# Patient Record
Sex: MALE | Race: WHITE
[De-identification: names, ages, dates, MRNs, and addresses within clinical notes are randomized per-mention and may not be internally consistent; named-entity substitution may affect disease eponyms.]

---

## 2020-08-01 ENCOUNTER — HOSPITAL ENCOUNTER (EMERGENCY)
Dept: HOSPITAL 92 - ERS | Age: 60
Discharge: HOME | End: 2020-08-01
Payer: SELF-PAY

## 2020-08-01 DIAGNOSIS — N39.0: ICD-10-CM

## 2020-08-01 DIAGNOSIS — D63.1: ICD-10-CM

## 2020-08-01 DIAGNOSIS — F17.210: ICD-10-CM

## 2020-08-01 DIAGNOSIS — N18.6: ICD-10-CM

## 2020-08-01 DIAGNOSIS — T83.511A: Primary | ICD-10-CM

## 2020-08-01 LAB
ALBUMIN SERPL BCG-MCNC: 3.6 G/DL (ref 3.5–5)
ALP SERPL-CCNC: 119 U/L (ref 40–110)
ALT SERPL W P-5'-P-CCNC: 14 U/L (ref 8–55)
ANION GAP SERPL CALC-SCNC: 12 MMOL/L (ref 10–20)
AST SERPL-CCNC: 20 U/L (ref 5–34)
BACTERIA UR QL AUTO: (no result) HPF
BASOPHILS # BLD AUTO: 0.1 THOU/UL (ref 0–0.2)
BASOPHILS NFR BLD AUTO: 0.9 % (ref 0–1)
BILIRUB SERPL-MCNC: 0.4 MG/DL (ref 0.2–1.2)
BUN SERPL-MCNC: 36 MG/DL (ref 8.4–25.7)
CALCIUM SERPL-MCNC: 8.6 MG/DL (ref 7.8–10.44)
CHLORIDE SERPL-SCNC: 98 MMOL/L (ref 98–107)
CO2 SERPL-SCNC: 26 MMOL/L (ref 22–29)
CREAT CL PREDICTED SERPL C-G-VRATE: 0 ML/MIN (ref 70–130)
EOSINOPHIL # BLD AUTO: 0.4 THOU/UL (ref 0–0.7)
EOSINOPHIL NFR BLD AUTO: 5.9 % (ref 0–10)
GLOBULIN SER CALC-MCNC: 3.7 G/DL (ref 2.4–3.5)
GLUCOSE SERPL-MCNC: 80 MG/DL (ref 70–105)
HGB BLD-MCNC: 11.7 G/DL (ref 14–18)
LYMPHOCYTES # BLD: 1.1 THOU/UL (ref 1.2–3.4)
LYMPHOCYTES NFR BLD AUTO: 17.5 % (ref 21–51)
MCH RBC QN AUTO: 33.2 PG (ref 27–31)
MCV RBC AUTO: 97.9 FL (ref 78–98)
MONOCYTES # BLD AUTO: 0.6 THOU/UL (ref 0.11–0.59)
MONOCYTES NFR BLD AUTO: 9.3 % (ref 0–10)
NEUTROPHILS # BLD AUTO: 4.2 THOU/UL (ref 1.4–6.5)
NEUTROPHILS NFR BLD AUTO: 66.5 % (ref 42–75)
PLATELET # BLD AUTO: 139 THOU/UL (ref 130–400)
POTASSIUM SERPL-SCNC: 4.3 MMOL/L (ref 3.5–5.1)
PROT UR STRIP.AUTO-MCNC: 100 MG/DL
RBC # BLD AUTO: 3.53 MILL/UL (ref 4.7–6.1)
RBC UR QL AUTO: (no result) HPF (ref 0–3)
SODIUM SERPL-SCNC: 132 MMOL/L (ref 136–145)
SP GR UR STRIP: 1.01 (ref 1–1.03)
WBC # BLD AUTO: 6.4 THOU/UL (ref 4.8–10.8)
WBC UR QL AUTO: (no result) HPF (ref 0–3)

## 2020-08-01 PROCEDURE — 81003 URINALYSIS AUTO W/O SCOPE: CPT

## 2020-08-01 PROCEDURE — 87086 URINE CULTURE/COLONY COUNT: CPT

## 2020-08-01 PROCEDURE — 81015 MICROSCOPIC EXAM OF URINE: CPT

## 2020-08-01 PROCEDURE — 80053 COMPREHEN METABOLIC PANEL: CPT

## 2020-08-01 PROCEDURE — 36415 COLL VENOUS BLD VENIPUNCTURE: CPT

## 2020-08-01 PROCEDURE — 87186 SC STD MICRODIL/AGAR DIL: CPT

## 2020-08-01 PROCEDURE — 85025 COMPLETE CBC W/AUTO DIFF WBC: CPT

## 2020-08-01 PROCEDURE — 87077 CULTURE AEROBIC IDENTIFY: CPT

## 2020-08-01 PROCEDURE — 51702 INSERT TEMP BLADDER CATH: CPT

## 2020-09-08 ENCOUNTER — HOSPITAL ENCOUNTER (EMERGENCY)
Dept: HOSPITAL 92 - ERS | Age: 60
Discharge: HOME | End: 2020-09-08
Payer: MEDICAID

## 2020-09-08 DIAGNOSIS — N18.6: ICD-10-CM

## 2020-09-08 DIAGNOSIS — F17.210: ICD-10-CM

## 2020-09-08 DIAGNOSIS — T83.511A: Primary | ICD-10-CM

## 2020-09-08 LAB
BACTERIA UR QL AUTO: (no result) HPF
LEUKOCYTE ESTERASE UR QL STRIP.AUTO: 500 LEU/UL
PROT UR STRIP.AUTO-MCNC: 50 MG/DL
SP GR UR STRIP: 1.01 (ref 1–1.04)

## 2020-09-08 PROCEDURE — 99283 EMERGENCY DEPT VISIT LOW MDM: CPT

## 2020-09-08 PROCEDURE — 81015 MICROSCOPIC EXAM OF URINE: CPT

## 2020-09-08 PROCEDURE — 81003 URINALYSIS AUTO W/O SCOPE: CPT

## 2020-09-23 ENCOUNTER — HOSPITAL ENCOUNTER (EMERGENCY)
Dept: HOSPITAL 92 - ERS | Age: 60
Discharge: HOME | End: 2020-09-23
Payer: MEDICAID

## 2020-09-23 DIAGNOSIS — I12.0: ICD-10-CM

## 2020-09-23 DIAGNOSIS — R07.81: Primary | ICD-10-CM
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
## 2020-09-23 DIAGNOSIS — N18.6: ICD-10-CM

## 2020-09-23 DIAGNOSIS — N39.0: ICD-10-CM

## 2020-09-23 LAB
ALBUMIN SERPL BCG-MCNC: 3.5 G/DL (ref 3.5–5)
ALP SERPL-CCNC: 133 U/L (ref 40–110)
ALT SERPL W P-5'-P-CCNC: 23 U/L (ref 8–55)
ANION GAP SERPL CALC-SCNC: 16 MMOL/L (ref 10–20)
AST SERPL-CCNC: 28 U/L (ref 5–34)
BACTERIA UR QL AUTO: (no result) HPF
BASOPHILS # BLD AUTO: 0 THOU/UL (ref 0–0.2)
BASOPHILS NFR BLD AUTO: 0.5 % (ref 0–1)
BILIRUB SERPL-MCNC: 0.2 MG/DL (ref 0.2–1.2)
BUN SERPL-MCNC: 45 MG/DL (ref 8.4–25.7)
CALCIUM SERPL-MCNC: 8.7 MG/DL (ref 7.8–10.44)
CHLORIDE SERPL-SCNC: 112 MMOL/L (ref 98–107)
CO2 SERPL-SCNC: 17 MMOL/L (ref 22–29)
CREAT CL PREDICTED SERPL C-G-VRATE: 0 ML/MIN (ref 70–130)
EOSINOPHIL # BLD AUTO: 0.3 THOU/UL (ref 0–0.7)
EOSINOPHIL NFR BLD AUTO: 3.3 % (ref 0–10)
GLOBULIN SER CALC-MCNC: 3.8 G/DL (ref 2.4–3.5)
GLUCOSE SERPL-MCNC: 117 MG/DL (ref 70–105)
HGB BLD-MCNC: 11.4 G/DL (ref 14–18)
LEUKOCYTE ESTERASE UR QL STRIP.AUTO: 250 LEU/UL
LYMPHOCYTES # BLD: 1.2 THOU/UL (ref 1.2–3.4)
LYMPHOCYTES NFR BLD AUTO: 12.3 % (ref 21–51)
MCH RBC QN AUTO: 33.5 PG (ref 27–31)
MCV RBC AUTO: 98.9 FL (ref 78–98)
MONOCYTES # BLD AUTO: 0.6 THOU/UL (ref 0.11–0.59)
MONOCYTES NFR BLD AUTO: 6 % (ref 0–10)
NEUTROPHILS # BLD AUTO: 7.8 THOU/UL (ref 1.4–6.5)
NEUTROPHILS NFR BLD AUTO: 77.9 % (ref 42–75)
PLATELET # BLD AUTO: 270 THOU/UL (ref 130–400)
POTASSIUM SERPL-SCNC: 4.6 MMOL/L (ref 3.5–5.1)
RBC # BLD AUTO: 3.39 MILL/UL (ref 4.7–6.1)
SODIUM SERPL-SCNC: 140 MMOL/L (ref 136–145)
SP GR UR STRIP: 1.01 (ref 1–1.04)
WBC # BLD AUTO: 10 THOU/UL (ref 4.8–10.8)

## 2020-09-23 PROCEDURE — 51702 INSERT TEMP BLADDER CATH: CPT

## 2020-09-23 PROCEDURE — 93005 ELECTROCARDIOGRAM TRACING: CPT

## 2020-09-23 PROCEDURE — 81015 MICROSCOPIC EXAM OF URINE: CPT

## 2020-09-23 PROCEDURE — 85025 COMPLETE CBC W/AUTO DIFF WBC: CPT

## 2020-09-23 PROCEDURE — 81003 URINALYSIS AUTO W/O SCOPE: CPT

## 2020-09-23 PROCEDURE — 71045 X-RAY EXAM CHEST 1 VIEW: CPT

## 2020-09-23 PROCEDURE — 80053 COMPREHEN METABOLIC PANEL: CPT

## 2020-09-23 NOTE — RAD
RADIOGRAPH CHEST 1 VIEW:

 

DAET: 9/23/2020.

TIME: 8:24 a.m.

 

HISTORY: 

A 60-year-old male with acute traumatic chest pain due to fall.

 

COMPARISON: 

5/7/2020.

 

FINDINGS:

There are no air space densities, pulmonary edema, pneumothorax, or cardiomegaly.  The lateral costop
hrenic angles are sharp.  Right IJ double lumen hemodialysis catheter remains.  The left IJ central v
enous catheter has been removed.  

 

IMPRESSION:  

No acute cardiopulmonary findings.

 

 

taamr []

 

POS: [facility]

## 2020-10-05 ENCOUNTER — HOSPITAL ENCOUNTER (OUTPATIENT)
Dept: HOSPITAL 92 - BICRAD | Age: 60
Discharge: HOME | End: 2020-10-05
Attending: NURSE PRACTITIONER
Payer: MEDICAID

## 2020-10-05 DIAGNOSIS — M54.9: ICD-10-CM

## 2020-10-05 DIAGNOSIS — M19.042: ICD-10-CM

## 2020-10-05 DIAGNOSIS — M19.041: ICD-10-CM

## 2020-10-05 DIAGNOSIS — S60.457A: ICD-10-CM

## 2020-10-05 DIAGNOSIS — M79.641: Primary | ICD-10-CM

## 2020-10-05 DIAGNOSIS — M47.816: ICD-10-CM

## 2020-10-05 DIAGNOSIS — M79.642: ICD-10-CM

## 2020-10-05 DIAGNOSIS — M43.16: ICD-10-CM

## 2020-10-05 PROCEDURE — 72100 X-RAY EXAM L-S SPINE 2/3 VWS: CPT

## 2020-10-05 NOTE — RAD
RIGHT HAND 2 VIEWS:

 

Date:  10/05/2020

 

HISTORY:  

Right hand pain. 

 

FINDINGS:

Osteoarthrosis changes noted involving the hand and wrist. No evidence for acute fracture or dislocat
ion. 

 

IMPRESSION: 

Osteoarthrosis without acute fracture or dislocation. 

 

 

POS: [facility]

## 2020-10-05 NOTE — RAD
LEFT HAND 2 VIEWS:

 

Date:  10/05/2020

 

HISTORY:  

Hand pain. 

 

FINDINGS:

Mild degenerative change at the radiocarpal joint with joint narrowing at this joint. Mild DJD at the
 first carpometacarpal joint with mild spurring. Mild degenerative change at the first MCP joint with
 joint narrowing and minimal spurring. The other MCP joints are unremarkable. 

 

IP joints are unremarkable without significant degenerative change. 

 

No erosive change. 

 

In the PA projection, there is metallic dense soft tissue foreign body seen along the radial aspect o
f the distal proximal phalanx of the fifth digit. 

 

In the lateral view, there is evidence of a faint metallic foreign body in the ventral soft tissues o
f the fifth finger distally ventral to the distal phalanx. 

 

IMPRESSION: 

1.  There are degenerative joint changes as described above. 

2.  Evidence of soft tissue foreign body within the fifth digit as described above. 

 

 

POS: [facility]

## 2020-10-05 NOTE — RAD
LUMBAR SPINE 3 VIEWS:

 

Date:  10/05/2020

 

HISTORY:  

Chronic back pain. 

 

FINDINGS/IMPRESSION: 

Degenerative changes are present. No fracture or bony destruction is seen. There is minimal retrolist
hesis of L4 over L5, L2 over L3, and L1 over L2 vertebral bodies. 

 

 

POS: OFF

## 2020-10-09 ENCOUNTER — HOSPITAL ENCOUNTER (EMERGENCY)
Dept: HOSPITAL 92 - ERS | Age: 60
Discharge: HOME | End: 2020-10-09
Payer: MEDICAID

## 2020-10-09 DIAGNOSIS — N39.0: Primary | ICD-10-CM

## 2020-10-09 DIAGNOSIS — I12.0: ICD-10-CM

## 2020-10-09 DIAGNOSIS — Z79.899: ICD-10-CM

## 2020-10-09 DIAGNOSIS — F17.210: ICD-10-CM

## 2020-10-09 DIAGNOSIS — N18.6: ICD-10-CM

## 2020-10-09 LAB
ALBUMIN SERPL BCG-MCNC: 3.6 G/DL (ref 3.5–5)
ALP SERPL-CCNC: 202 U/L (ref 40–110)
ALT SERPL W P-5'-P-CCNC: 57 U/L (ref 8–55)
ANION GAP SERPL CALC-SCNC: 13 MMOL/L (ref 10–20)
AST SERPL-CCNC: 59 U/L (ref 5–34)
BACTERIA UR QL AUTO: (no result) HPF
BASOPHILS # BLD AUTO: 0.1 THOU/UL (ref 0–0.2)
BASOPHILS NFR BLD AUTO: 1.3 % (ref 0–1)
BILIRUB SERPL-MCNC: 0.3 MG/DL (ref 0.2–1.2)
BUN SERPL-MCNC: 42 MG/DL (ref 8.4–25.7)
CALCIUM SERPL-MCNC: 8.4 MG/DL (ref 7.8–10.44)
CHLORIDE SERPL-SCNC: 107 MMOL/L (ref 98–107)
CO2 SERPL-SCNC: 20 MMOL/L (ref 22–29)
CREAT CL PREDICTED SERPL C-G-VRATE: 0 ML/MIN (ref 70–130)
EOSINOPHIL # BLD AUTO: 0 THOU/UL (ref 0–0.7)
EOSINOPHIL NFR BLD AUTO: 0.8 % (ref 0–10)
GLOBULIN SER CALC-MCNC: 3.6 G/DL (ref 2.4–3.5)
GLUCOSE SERPL-MCNC: 109 MG/DL (ref 70–105)
GLUCOSE UR STRIP-MCNC: 30 MG/DL
HGB BLD-MCNC: 11.2 G/DL (ref 14–18)
LEUKOCYTE ESTERASE UR QL STRIP.AUTO: 25 LEU/UL
LYMPHOCYTES # BLD: 1 THOU/UL (ref 1.2–3.4)
LYMPHOCYTES NFR BLD AUTO: 17 % (ref 21–51)
MCH RBC QN AUTO: 34.7 PG (ref 27–31)
MCV RBC AUTO: 98.3 FL (ref 78–98)
MONOCYTES # BLD AUTO: 0.3 THOU/UL (ref 0.11–0.59)
MONOCYTES NFR BLD AUTO: 5.9 % (ref 0–10)
NEUTROPHILS # BLD AUTO: 4.2 THOU/UL (ref 1.4–6.5)
NEUTROPHILS NFR BLD AUTO: 74.9 % (ref 42–75)
PLATELET # BLD AUTO: 203 THOU/UL (ref 130–400)
POTASSIUM SERPL-SCNC: 3.8 MMOL/L (ref 3.5–5.1)
PROT UR STRIP.AUTO-MCNC: 10 MG/DL
RBC # BLD AUTO: 3.23 MILL/UL (ref 4.7–6.1)
RBC UR QL AUTO: (no result) HPF (ref 0–3)
SODIUM SERPL-SCNC: 136 MMOL/L (ref 136–145)
SP GR UR STRIP: 1.01 (ref 1–1.04)
WBC # BLD AUTO: 5.6 THOU/UL (ref 4.8–10.8)
WBC UR QL AUTO: (no result) HPF (ref 0–3)

## 2020-10-09 PROCEDURE — 85025 COMPLETE CBC W/AUTO DIFF WBC: CPT

## 2020-10-09 PROCEDURE — 81015 MICROSCOPIC EXAM OF URINE: CPT

## 2020-10-09 PROCEDURE — 87086 URINE CULTURE/COLONY COUNT: CPT

## 2020-10-09 PROCEDURE — 51702 INSERT TEMP BLADDER CATH: CPT

## 2020-10-09 PROCEDURE — 36415 COLL VENOUS BLD VENIPUNCTURE: CPT

## 2020-10-09 PROCEDURE — 80053 COMPREHEN METABOLIC PANEL: CPT

## 2020-10-09 PROCEDURE — 81003 URINALYSIS AUTO W/O SCOPE: CPT

## 2020-10-26 ENCOUNTER — HOSPITAL ENCOUNTER (OUTPATIENT)
Dept: HOSPITAL 92 - SPEC | Age: 60
Discharge: HOME | End: 2020-10-26
Attending: UROLOGY
Payer: COMMERCIAL

## 2020-10-26 VITALS — DIASTOLIC BLOOD PRESSURE: 69 MMHG | TEMPERATURE: 98.6 F | SYSTOLIC BLOOD PRESSURE: 121 MMHG

## 2020-10-26 VITALS — BODY MASS INDEX: 23.6 KG/M2

## 2020-10-26 DIAGNOSIS — N13.30: ICD-10-CM

## 2020-10-26 DIAGNOSIS — Z99.2: ICD-10-CM

## 2020-10-26 DIAGNOSIS — F17.200: ICD-10-CM

## 2020-10-26 DIAGNOSIS — Z79.899: ICD-10-CM

## 2020-10-26 DIAGNOSIS — R33.8: ICD-10-CM

## 2020-10-26 DIAGNOSIS — N18.6: ICD-10-CM

## 2020-10-26 DIAGNOSIS — N40.1: Primary | ICD-10-CM

## 2020-10-26 LAB
APTT PPP: 28.1 SEC (ref 22.9–36.1)
BASOPHILS # BLD AUTO: 0 THOU/UL (ref 0–0.2)
BASOPHILS NFR BLD AUTO: 0.6 % (ref 0–1)
EOSINOPHIL # BLD AUTO: 0.3 THOU/UL (ref 0–0.7)
EOSINOPHIL NFR BLD AUTO: 4.5 % (ref 0–10)
HGB BLD-MCNC: 12.6 G/DL (ref 14–18)
INR PPP: 1
LYMPHOCYTES # BLD: 1.3 THOU/UL (ref 1.2–3.4)
LYMPHOCYTES NFR BLD AUTO: 17.7 % (ref 21–51)
MCH RBC QN AUTO: 33.5 PG (ref 27–31)
MCV RBC AUTO: 101 FL (ref 78–98)
MONOCYTES # BLD AUTO: 0.7 THOU/UL (ref 0.11–0.59)
MONOCYTES NFR BLD AUTO: 9.1 % (ref 0–10)
NEUTROPHILS # BLD AUTO: 5.1 THOU/UL (ref 1.4–6.5)
NEUTROPHILS NFR BLD AUTO: 68.1 % (ref 42–75)
PLATELET # BLD AUTO: 167 THOU/UL (ref 130–400)
PROTHROMBIN TIME: 13.7 SEC (ref 12–14.7)
RBC # BLD AUTO: 3.78 MILL/UL (ref 4.7–6.1)
WBC # BLD AUTO: 7.4 THOU/UL (ref 4.8–10.8)

## 2020-10-26 PROCEDURE — 85730 THROMBOPLASTIN TIME PARTIAL: CPT

## 2020-10-26 PROCEDURE — 77002 NEEDLE LOCALIZATION BY XRAY: CPT

## 2020-10-26 PROCEDURE — 85025 COMPLETE CBC W/AUTO DIFF WBC: CPT

## 2020-10-26 PROCEDURE — 51102 DRAIN BL W/CATH INSERTION: CPT

## 2020-10-26 PROCEDURE — 0T9B30Z DRAINAGE OF BLADDER WITH DRAINAGE DEVICE, PERCUTANEOUS APPROACH: ICD-10-PCS | Performed by: RADIOLOGY

## 2020-10-26 PROCEDURE — 85610 PROTHROMBIN TIME: CPT

## 2020-10-26 PROCEDURE — C2627 CATH, SUPRAPUBIC/CYSTOSCOPIC: HCPCS

## 2020-10-26 PROCEDURE — 36415 COLL VENOUS BLD VENIPUNCTURE: CPT

## 2020-10-26 NOTE — CT
Suprapubic catheter placement CT-guided

Conscious sedation: At least 20 minutes spent with the patient for conscious sedation.



FINDINGS: After explaining the procedure and answering all questions, limited CT imaging of the pelvi
s was performed. Approximately 250 cc sterile saline was infused into the urinary bladder via the

indwelling King catheter to achieve better bladder distention.



Sterile technique, buffered local anesthesia, conscious sedation, CT guidance, and an anterior midlin
e suprapubic approach were used to carefully advance a 12 [place] suprapubic catheter into the

urinary bladder via trocar technique. Clear urine was drained.



Retention balloon was inflated with sterile water. Catheter was secured externally and left draining 
to gravity.



Patient tolerated the procedure well and was returned to the holding area in good condition for furth
er monitoring.



  



IMPRESSION :

Technically successful CT-guided suprapubic catheter placement.



Reported By: VIVIENNE Hancock 

Electronically Signed:  10/26/2020 10:54 AM

## 2020-10-27 NOTE — CT
Suprapubic catheter placement CT-guided

Conscious sedation: At least 20 minutes spent with the patient for conscious sedation.



HISTORY: Urinary retention.



FINDINGS: After explaining the procedure and answering all questions, limited CT imaging of the pelvi
s was performed. Approximately 250 cc sterile saline was infused into the urinary bladder via the

indwelling King catheter to achieve better bladder distention.



Sterile technique, buffered local anesthesia, conscious sedation, CT guidance, and an anterior midlin
e suprapubic approach were used to carefully advance a 12 [place] suprapubic catheter into the

urinary bladder via trocar technique. Clear urine was drained.



Retention balloon was inflated with sterile water. Catheter was secured externally and left draining 
to gravity.



Patient tolerated the procedure well and was [facility] the holding [facility].



  



IMPRESSION :

Technically successful CT-guided suprapubic catheter placement.



Transcribed Date/Time: 10/27/2020 1:35 PM



Reported By: VIVIENNE Hancock 

Electronically Signed:  10/28/2020 2:08 PM

## 2021-05-19 ENCOUNTER — HOSPITAL ENCOUNTER (OUTPATIENT)
Dept: HOSPITAL 92 - BICMRI | Age: 61
Discharge: HOME | End: 2021-05-19
Attending: NURSE PRACTITIONER
Payer: COMMERCIAL

## 2021-05-19 DIAGNOSIS — M47.26: Primary | ICD-10-CM

## 2021-05-19 DIAGNOSIS — M47.817: ICD-10-CM

## 2021-05-19 PROCEDURE — 72148 MRI LUMBAR SPINE W/O DYE: CPT

## 2021-09-07 ENCOUNTER — HOSPITAL ENCOUNTER (OUTPATIENT)
Dept: HOSPITAL 92 - NM | Age: 61
Discharge: HOME | End: 2021-09-07
Attending: RADIOLOGY
Payer: COMMERCIAL

## 2021-09-07 DIAGNOSIS — C61: Primary | ICD-10-CM

## 2021-09-07 DIAGNOSIS — C79.51: ICD-10-CM

## 2021-09-07 PROCEDURE — A9503 TC99M MEDRONATE: HCPCS

## 2021-09-07 PROCEDURE — 78306 BONE IMAGING WHOLE BODY: CPT

## 2021-12-13 ENCOUNTER — HOSPITAL ENCOUNTER (EMERGENCY)
Dept: HOSPITAL 92 - ERS | Age: 61
Discharge: LEFT BEFORE DISCHARGE | End: 2021-12-13
Payer: COMMERCIAL

## 2021-12-13 DIAGNOSIS — Z53.21: Primary | ICD-10-CM

## 2022-06-14 ENCOUNTER — HOSPITAL ENCOUNTER (OUTPATIENT)
Dept: HOSPITAL 92 - ULT | Age: 62
Discharge: HOME | End: 2022-06-14
Attending: PHYSICIAN ASSISTANT
Payer: COMMERCIAL

## 2022-06-14 DIAGNOSIS — R93.2: ICD-10-CM

## 2022-06-14 DIAGNOSIS — Z86.19: ICD-10-CM

## 2022-06-14 DIAGNOSIS — R16.1: ICD-10-CM

## 2022-06-14 DIAGNOSIS — B18.2: Primary | ICD-10-CM

## 2022-06-14 DIAGNOSIS — K83.8: ICD-10-CM

## 2022-06-14 PROCEDURE — 76705 ECHO EXAM OF ABDOMEN: CPT

## 2023-04-14 ENCOUNTER — HOSPITAL ENCOUNTER (EMERGENCY)
Dept: HOSPITAL 92 - ERS | Age: 63
Discharge: HOME | End: 2023-04-14
Payer: COMMERCIAL

## 2023-04-14 DIAGNOSIS — N18.6: ICD-10-CM
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
## 2023-04-14 DIAGNOSIS — S52.121A: Primary | ICD-10-CM

## 2023-04-14 DIAGNOSIS — I12.0: ICD-10-CM

## 2023-04-14 DIAGNOSIS — Z99.2: ICD-10-CM

## 2023-04-14 LAB
ANION GAP SERPL CALC-SCNC: 12 MMOL/L (ref 10–20)
BASOPHILS # BLD AUTO: 0.1 THOU/UL (ref 0–0.2)
BASOPHILS NFR BLD AUTO: 1 % (ref 0–1)
BUN SERPL-MCNC: 14 MG/DL (ref 8.4–25.7)
CALCIUM SERPL-MCNC: 9.2 MG/DL (ref 7.8–10.44)
CHLORIDE SERPL-SCNC: 105 MMOL/L (ref 98–107)
CO2 SERPL-SCNC: 24 MMOL/L (ref 23–31)
CREAT CL PREDICTED SERPL C-G-VRATE: 0 ML/MIN (ref 70–130)
EOSINOPHIL # BLD AUTO: 0.1 THOU/UL (ref 0–0.7)
EOSINOPHIL NFR BLD AUTO: 1.4 % (ref 0–10)
GLUCOSE SERPL-MCNC: 133 MG/DL (ref 80–115)
HGB BLD-MCNC: 15.2 G/DL (ref 14–18)
LYMPHOCYTES # BLD: 0.6 THOU/UL (ref 1.2–3.4)
LYMPHOCYTES NFR BLD AUTO: 8.2 % (ref 21–51)
MCH RBC QN AUTO: 34.4 PG (ref 27–31)
MCV RBC AUTO: 100 FL (ref 78–98)
MONOCYTES # BLD AUTO: 0.5 THOU/UL (ref 0.11–0.59)
MONOCYTES NFR BLD AUTO: 6.6 % (ref 0–10)
NEUTROPHILS # BLD AUTO: 5.8 THOU/UL (ref 1.4–6.5)
NEUTROPHILS NFR BLD AUTO: 82.8 % (ref 42–75)
PLATELET # BLD AUTO: 184 10X3/UL (ref 130–400)
POTASSIUM SERPL-SCNC: 4.4 MMOL/L (ref 3.5–5.1)
RBC # BLD AUTO: 4.41 MILL/UL (ref 4.7–6.1)
SODIUM SERPL-SCNC: 137 MMOL/L (ref 136–145)
WBC # BLD AUTO: 7.1 10X3/UL (ref 4.8–10.8)

## 2023-04-14 PROCEDURE — 80048 BASIC METABOLIC PNL TOTAL CA: CPT

## 2023-04-14 PROCEDURE — 71045 X-RAY EXAM CHEST 1 VIEW: CPT

## 2023-04-14 PROCEDURE — 96374 THER/PROPH/DIAG INJ IV PUSH: CPT

## 2023-04-14 PROCEDURE — 84484 ASSAY OF TROPONIN QUANT: CPT

## 2023-04-14 PROCEDURE — 36415 COLL VENOUS BLD VENIPUNCTURE: CPT

## 2023-04-14 PROCEDURE — 85025 COMPLETE CBC W/AUTO DIFF WBC: CPT

## 2023-04-14 PROCEDURE — 70450 CT HEAD/BRAIN W/O DYE: CPT

## 2023-04-14 PROCEDURE — 93005 ELECTROCARDIOGRAM TRACING: CPT

## 2023-04-14 PROCEDURE — 72125 CT NECK SPINE W/O DYE: CPT

## 2023-06-08 ENCOUNTER — HOSPITAL ENCOUNTER (INPATIENT)
Dept: HOSPITAL 92 - ERS | Age: 63
LOS: 4 days | Discharge: LEFT BEFORE DISCHARGE | DRG: 698 | End: 2023-06-12
Attending: FAMILY MEDICINE | Admitting: FAMILY MEDICINE
Payer: COMMERCIAL

## 2023-06-08 VITALS — BODY MASS INDEX: 26.6 KG/M2

## 2023-06-08 DIAGNOSIS — M54.50: ICD-10-CM

## 2023-06-08 DIAGNOSIS — D69.6: ICD-10-CM

## 2023-06-08 DIAGNOSIS — C78.00: ICD-10-CM

## 2023-06-08 DIAGNOSIS — N40.0: ICD-10-CM

## 2023-06-08 DIAGNOSIS — Z99.2: ICD-10-CM

## 2023-06-08 DIAGNOSIS — N17.9: ICD-10-CM

## 2023-06-08 DIAGNOSIS — I50.20: ICD-10-CM

## 2023-06-08 DIAGNOSIS — I25.2: ICD-10-CM

## 2023-06-08 DIAGNOSIS — Z98.890: ICD-10-CM

## 2023-06-08 DIAGNOSIS — I48.91: ICD-10-CM

## 2023-06-08 DIAGNOSIS — F11.90: ICD-10-CM

## 2023-06-08 DIAGNOSIS — I50.22: ICD-10-CM

## 2023-06-08 DIAGNOSIS — R91.1: ICD-10-CM

## 2023-06-08 DIAGNOSIS — N18.6: ICD-10-CM

## 2023-06-08 DIAGNOSIS — T83.510A: Primary | ICD-10-CM

## 2023-06-08 DIAGNOSIS — G89.29: ICD-10-CM

## 2023-06-08 DIAGNOSIS — J44.1: ICD-10-CM

## 2023-06-08 DIAGNOSIS — J18.9: ICD-10-CM

## 2023-06-08 DIAGNOSIS — D50.9: ICD-10-CM

## 2023-06-08 DIAGNOSIS — Z66: ICD-10-CM

## 2023-06-08 DIAGNOSIS — J96.01: ICD-10-CM

## 2023-06-08 DIAGNOSIS — J44.0: ICD-10-CM

## 2023-06-08 DIAGNOSIS — Z59.00: ICD-10-CM

## 2023-06-08 DIAGNOSIS — C61: ICD-10-CM

## 2023-06-08 DIAGNOSIS — I13.2: ICD-10-CM

## 2023-06-08 DIAGNOSIS — Z79.899: ICD-10-CM
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
## 2023-06-08 DIAGNOSIS — F17.210: ICD-10-CM

## 2023-06-08 DIAGNOSIS — N39.0: ICD-10-CM

## 2023-06-08 DIAGNOSIS — D63.1: ICD-10-CM

## 2023-06-08 DIAGNOSIS — C79.9: ICD-10-CM

## 2023-06-08 LAB
ALBUMIN SERPL BCG-MCNC: 3.4 G/DL (ref 3.4–4.8)
ALP SERPL-CCNC: 136 U/L (ref 40–110)
ALT SERPL W P-5'-P-CCNC: 10 U/L (ref 8–55)
ANALYZER IN CARDIO: (no result)
ANALYZER IN CARDIO: (no result)
ANION GAP SERPL CALC-SCNC: 13 MMOL/L (ref 10–20)
APAP SERPL-MCNC: (no result) MCG/ML (ref 10–30)
APTT PPP: 30.2 SEC (ref 22.9–36.1)
AST SERPL-CCNC: 23 U/L (ref 5–34)
BACTERIA UR QL AUTO: (no result) HPF
BASE EXCESS STD BLDV CALC-SCNC: -2.2 MEQ/L
BASE EXCESS STD BLDV CALC-SCNC: -3.8 MEQ/L
BASOPHILS # BLD AUTO: 0 THOU/UL (ref 0–0.2)
BASOPHILS NFR BLD AUTO: 0.6 % (ref 0–1)
BILIRUB SERPL-MCNC: 0.5 MG/DL (ref 0.2–1.2)
BUN SERPL-MCNC: 14 MG/DL (ref 8.4–25.7)
CA-I BLDV-MCNC: 0.96 MMOL/L (ref 1.16–1.32)
CA-I BLDV-MCNC: 1.08 MMOL/L (ref 1.16–1.32)
CALCIUM SERPL-MCNC: 8.6 MG/DL (ref 7.8–10.44)
CAUTI INDICATIONS FOR CULTURE: (no result)
CHLORIDE BLDV-SCNC: 105 MMOL/L (ref 98–106)
CHLORIDE BLDV-SCNC: 109 MMOL/L (ref 98–106)
CHLORIDE SERPL-SCNC: 105 MMOL/L (ref 98–107)
CO2 SERPL-SCNC: 22 MMOL/L (ref 23–31)
CREAT CL PREDICTED SERPL C-G-VRATE: 0 ML/MIN (ref 70–130)
D DIMER PPP FEU-MCNC: 1.31 *MCG/ML (ref 0.27–0.43)
DRUG SCREEN CUTOFF: (no result)
EOSINOPHIL # BLD AUTO: 0.2 THOU/UL (ref 0–0.7)
EOSINOPHIL NFR BLD AUTO: 2.6 % (ref 0–10)
GLOBULIN SER CALC-MCNC: 3 G/DL (ref 2.4–3.5)
GLUCOSE SERPL-MCNC: 96 MG/DL (ref 80–115)
HCO3 BLDV-SCNC: 20.4 MEQ/L (ref 22–28)
HCO3 BLDV-SCNC: 23.2 MEQ/L (ref 22–28)
HCT VFR BLDV CALC: 39 % (ref 42–52)
HCT VFR BLDV CALC: 40 % (ref 42–52)
HGB BLD-MCNC: 12.3 G/DL (ref 14–18)
HGB BLDV-MCNC: 13.2 G/DL (ref 13.1–17.2)
HGB BLDV-MCNC: 13.6 G/DL (ref 13.1–17.2)
INR PPP: 1.2
LEUKOCYTE ESTERASE UR QL STRIP.AUTO: 500 LEU/UL
LYMPHOCYTES NFR BLD AUTO: 9.1 % (ref 21–51)
MCH RBC QN AUTO: 33 PG (ref 27–31)
MCV RBC AUTO: 97.1 FL (ref 78–98)
MONOCYTES # BLD AUTO: 0.8 THOU/UL (ref 0.11–0.59)
MONOCYTES NFR BLD AUTO: 11.4 % (ref 0–10)
NEUTROPHILS # BLD AUTO: 5 THOU/UL (ref 1.4–6.5)
NEUTROPHILS NFR BLD AUTO: 76 % (ref 42–75)
PLATELET # BLD AUTO: 183 10X3/UL (ref 130–400)
POTASSIUM BLDV-SCNC: 4.18 MMOL/L (ref 3.7–5.3)
POTASSIUM BLDV-SCNC: 4.2 MMOL/L (ref 3.7–5.3)
POTASSIUM SERPL-SCNC: 4.2 MMOL/L (ref 3.5–5.1)
PROT UR STRIP.AUTO-MCNC: 300 MG/DL
PROTHROMBIN TIME: 15.2 SEC (ref 12–14.7)
RBC # BLD AUTO: 3.73 MILL/UL (ref 4.7–6.1)
RBC UR QL AUTO: (no result) HPF (ref 0–3)
SALICYLATES SERPL-MCNC: (no result) MG/DL (ref 15–30)
SODIUM BLDV-SCNC: 136.6 MMOL/L (ref 133–146)
SODIUM BLDV-SCNC: 137.2 MMOL/L (ref 133–146)
SODIUM SERPL-SCNC: 136 MMOL/L (ref 136–145)
SP GR UR STRIP: 1.02 (ref 1–1.04)
WBC # BLD AUTO: 6.6 10X3/UL (ref 4.8–10.8)

## 2023-06-08 PROCEDURE — 85730 THROMBOPLASTIN TIME PARTIAL: CPT

## 2023-06-08 PROCEDURE — 84484 ASSAY OF TROPONIN QUANT: CPT

## 2023-06-08 PROCEDURE — 85379 FIBRIN DEGRADATION QUANT: CPT

## 2023-06-08 PROCEDURE — 96367 TX/PROPH/DG ADDL SEQ IV INF: CPT

## 2023-06-08 PROCEDURE — 83605 ASSAY OF LACTIC ACID: CPT

## 2023-06-08 PROCEDURE — 87086 URINE CULTURE/COLONY COUNT: CPT

## 2023-06-08 PROCEDURE — 96365 THER/PROPH/DIAG IV INF INIT: CPT

## 2023-06-08 PROCEDURE — 85610 PROTHROMBIN TIME: CPT

## 2023-06-08 PROCEDURE — 80306 DRUG TEST PRSMV INSTRMNT: CPT

## 2023-06-08 PROCEDURE — 93005 ELECTROCARDIOGRAM TRACING: CPT

## 2023-06-08 PROCEDURE — 4A043R1 MEASUREMENT OF VENOUS SATURATION, PERIPHERAL, PERCUTANEOUS APPROACH: ICD-10-PCS | Performed by: FAMILY MEDICINE

## 2023-06-08 PROCEDURE — 97139 UNLISTED THERAPEUTIC PX: CPT

## 2023-06-08 PROCEDURE — 71045 X-RAY EXAM CHEST 1 VIEW: CPT

## 2023-06-08 PROCEDURE — 94640 AIRWAY INHALATION TREATMENT: CPT

## 2023-06-08 PROCEDURE — 96366 THER/PROPH/DIAG IV INF ADDON: CPT

## 2023-06-08 PROCEDURE — 85025 COMPLETE CBC W/AUTO DIFF WBC: CPT

## 2023-06-08 PROCEDURE — 83880 ASSAY OF NATRIURETIC PEPTIDE: CPT

## 2023-06-08 PROCEDURE — 36415 COLL VENOUS BLD VENIPUNCTURE: CPT

## 2023-06-08 PROCEDURE — 80307 DRUG TEST PRSMV CHEM ANLYZR: CPT

## 2023-06-08 PROCEDURE — 87077 CULTURE AEROBIC IDENTIFY: CPT

## 2023-06-08 PROCEDURE — 74176 CT ABD & PELVIS W/O CONTRAST: CPT

## 2023-06-08 PROCEDURE — 82805 BLOOD GASES W/O2 SATURATION: CPT

## 2023-06-08 PROCEDURE — 80048 BASIC METABOLIC PNL TOTAL CA: CPT

## 2023-06-08 PROCEDURE — 87040 BLOOD CULTURE FOR BACTERIA: CPT

## 2023-06-08 PROCEDURE — 81001 URINALYSIS AUTO W/SCOPE: CPT

## 2023-06-08 PROCEDURE — 96361 HYDRATE IV INFUSION ADD-ON: CPT

## 2023-06-08 PROCEDURE — 87186 SC STD MICRODIL/AGAR DIL: CPT

## 2023-06-08 PROCEDURE — 80053 COMPREHEN METABOLIC PANEL: CPT

## 2023-06-08 PROCEDURE — 84145 PROCALCITONIN (PCT): CPT

## 2023-06-08 RX ADMIN — HEPARIN SODIUM SCH UNITS: 5000 INJECTION, SOLUTION INTRAVENOUS; SUBCUTANEOUS at 21:31

## 2023-06-08 SDOH — ECONOMIC STABILITY - HOUSING INSECURITY: HOMELESSNESS UNSPECIFIED: Z59.00

## 2023-06-09 LAB
ANION GAP SERPL CALC-SCNC: 14 MMOL/L (ref 10–20)
BASOPHILS # BLD AUTO: 0.1 THOU/UL (ref 0–0.2)
BASOPHILS NFR BLD AUTO: 1 % (ref 0–1)
BUN SERPL-MCNC: 11 MG/DL (ref 8.4–25.7)
BURR CELLS BLD QL SMEAR: (no result) HPF (ref 0–1)
CALCIUM SERPL-MCNC: 8.6 MG/DL (ref 7.8–10.44)
CHLORIDE SERPL-SCNC: 109 MMOL/L (ref 98–107)
CO2 SERPL-SCNC: 19 MMOL/L (ref 23–31)
CREAT CL PREDICTED SERPL C-G-VRATE: 36 ML/MIN (ref 70–130)
EOSINOPHIL # BLD AUTO: 0.2 THOU/UL (ref 0–0.7)
EOSINOPHIL NFR BLD AUTO: 4.1 % (ref 0–10)
GLUCOSE SERPL-MCNC: 64 MG/DL (ref 80–115)
HGB BLD-MCNC: 14 G/DL (ref 14–18)
LYMPHOCYTES NFR BLD AUTO: 12.7 % (ref 21–51)
MACROCYTES BLD QL SMEAR: (no result) HPF (ref 0–5)
MANUAL DIFF??: YES
MCH RBC QN AUTO: 32.3 PG (ref 27–31)
MCV RBC AUTO: 99.3 FL (ref 78–98)
MONOCYTES # BLD AUTO: 0.6 THOU/UL (ref 0.11–0.59)
MONOCYTES NFR BLD AUTO: 12.5 % (ref 0–10)
NEUTROPHILS # BLD AUTO: 3.4 THOU/UL (ref 1.4–6.5)
NEUTROPHILS NFR BLD AUTO: 69.5 % (ref 42–75)
OVALOCYTES BLD QL SMEAR: (no result) HPF (ref 0–1)
PLATELET # BLD AUTO: 150 10X3/UL (ref 130–400)
POLYCHROMASIA BLD QL SMEAR: (no result) HPF (ref 0–2)
POTASSIUM SERPL-SCNC: 4.2 MMOL/L (ref 3.5–5.1)
RBC # BLD AUTO: 4.33 MILL/UL (ref 4.7–6.1)
SODIUM SERPL-SCNC: 138 MMOL/L (ref 136–145)
WBC # BLD AUTO: 4.9 10X3/UL (ref 4.8–10.8)

## 2023-06-09 RX ADMIN — HEPARIN SODIUM SCH UNITS: 5000 INJECTION, SOLUTION INTRAVENOUS; SUBCUTANEOUS at 11:05

## 2023-06-09 RX ADMIN — HEPARIN SODIUM SCH UNITS: 5000 INJECTION, SOLUTION INTRAVENOUS; SUBCUTANEOUS at 20:04

## 2023-06-09 RX ADMIN — HEPARIN SODIUM SCH UNITS: 5000 INJECTION, SOLUTION INTRAVENOUS; SUBCUTANEOUS at 16:20

## 2023-06-10 LAB
ALBUMIN SERPL BCG-MCNC: 2.7 G/DL (ref 3.4–4.8)
ALP SERPL-CCNC: 118 U/L (ref 40–110)
ALT SERPL W P-5'-P-CCNC: 10 U/L (ref 8–55)
ANION GAP SERPL CALC-SCNC: 12 MMOL/L (ref 10–20)
AST SERPL-CCNC: 31 U/L (ref 5–34)
BASOPHILS # BLD AUTO: 0 THOU/UL (ref 0–0.2)
BASOPHILS NFR BLD AUTO: 0.5 % (ref 0–1)
BILIRUB SERPL-MCNC: (no result) MG/DL (ref 0.2–1.2)
BUN SERPL-MCNC: 16 MG/DL (ref 8.4–25.7)
CALCIUM SERPL-MCNC: 8.7 MG/DL (ref 7.8–10.44)
CHLORIDE SERPL-SCNC: 108 MMOL/L (ref 98–107)
CO2 SERPL-SCNC: 19 MMOL/L (ref 23–31)
CREAT CL PREDICTED SERPL C-G-VRATE: 50 ML/MIN (ref 70–130)
EOSINOPHIL # BLD AUTO: 0 THOU/UL (ref 0–0.7)
EOSINOPHIL NFR BLD AUTO: 0.2 % (ref 0–10)
GLOBULIN SER CALC-MCNC: 3 G/DL (ref 2.4–3.5)
GLUCOSE SERPL-MCNC: 142 MG/DL (ref 80–115)
HGB BLD-MCNC: 12.9 G/DL (ref 14–18)
LYMPHOCYTES NFR BLD AUTO: 8.4 % (ref 21–51)
MCH RBC QN AUTO: 32.3 PG (ref 27–31)
MCV RBC AUTO: 100.8 FL (ref 78–98)
MONOCYTES # BLD AUTO: 0.3 THOU/UL (ref 0.11–0.59)
MONOCYTES NFR BLD AUTO: 7 % (ref 0–10)
NEUTROPHILS # BLD AUTO: 3.5 THOU/UL (ref 1.4–6.5)
NEUTROPHILS NFR BLD AUTO: 83.7 % (ref 42–75)
PLATELET # BLD AUTO: 127 10X3/UL (ref 130–400)
POTASSIUM SERPL-SCNC: 4.9 MMOL/L (ref 3.5–5.1)
RBC # BLD AUTO: 3.99 MILL/UL (ref 4.7–6.1)
SODIUM SERPL-SCNC: 134 MMOL/L (ref 136–145)
WBC # BLD AUTO: 4.2 10X3/UL (ref 4.8–10.8)

## 2023-06-10 RX ADMIN — HEPARIN SODIUM SCH: 5000 INJECTION, SOLUTION INTRAVENOUS; SUBCUTANEOUS at 14:44

## 2023-06-10 RX ADMIN — HEPARIN SODIUM SCH UNITS: 5000 INJECTION, SOLUTION INTRAVENOUS; SUBCUTANEOUS at 10:01

## 2023-06-10 RX ADMIN — HEPARIN SODIUM SCH UNITS: 5000 INJECTION, SOLUTION INTRAVENOUS; SUBCUTANEOUS at 21:39

## 2023-06-11 RX ADMIN — HEPARIN SODIUM SCH UNITS: 5000 INJECTION, SOLUTION INTRAVENOUS; SUBCUTANEOUS at 09:10

## 2023-06-11 RX ADMIN — HEPARIN SODIUM SCH UNITS: 5000 INJECTION, SOLUTION INTRAVENOUS; SUBCUTANEOUS at 14:24

## 2023-06-11 RX ADMIN — HEPARIN SODIUM SCH UNITS: 5000 INJECTION, SOLUTION INTRAVENOUS; SUBCUTANEOUS at 20:35

## 2023-06-12 VITALS — SYSTOLIC BLOOD PRESSURE: 149 MMHG | DIASTOLIC BLOOD PRESSURE: 101 MMHG | TEMPERATURE: 97.7 F

## 2023-09-14 ENCOUNTER — HOSPITAL ENCOUNTER (INPATIENT)
Dept: HOSPITAL 92 - ERS | Age: 63
LOS: 4 days | Discharge: HOME | DRG: 564 | End: 2023-09-18
Attending: SURGERY | Admitting: SURGERY
Payer: COMMERCIAL

## 2023-09-14 VITALS — BODY MASS INDEX: 15 KG/M2
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
## 2023-09-14 DIAGNOSIS — I48.91: ICD-10-CM

## 2023-09-14 DIAGNOSIS — N18.30: ICD-10-CM

## 2023-09-14 DIAGNOSIS — N39.0: ICD-10-CM

## 2023-09-14 DIAGNOSIS — G89.29: ICD-10-CM

## 2023-09-14 DIAGNOSIS — T79.6XXA: Primary | ICD-10-CM

## 2023-09-14 DIAGNOSIS — I50.22: ICD-10-CM
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
## 2023-09-14 DIAGNOSIS — M43.12: ICD-10-CM

## 2023-09-14 DIAGNOSIS — I13.0: ICD-10-CM

## 2023-09-14 DIAGNOSIS — G93.41: ICD-10-CM

## 2023-09-14 DIAGNOSIS — S22.42XA: ICD-10-CM

## 2023-09-14 DIAGNOSIS — I25.2: ICD-10-CM

## 2023-09-14 DIAGNOSIS — J18.9: ICD-10-CM

## 2023-09-14 DIAGNOSIS — Z85.46: ICD-10-CM

## 2023-09-14 DIAGNOSIS — N17.9: ICD-10-CM

## 2023-09-14 DIAGNOSIS — F17.210: ICD-10-CM

## 2023-09-14 DIAGNOSIS — N40.0: ICD-10-CM

## 2023-09-14 LAB
ALBUMIN SERPL BCG-MCNC: 3.3 G/DL (ref 3.4–4.8)
ALP SERPL-CCNC: 221 U/L (ref 40–110)
ALT SERPL W P-5'-P-CCNC: 26 U/L (ref 8–55)
ANALYZER IN CARDIO: (no result)
ANION GAP SERPL CALC-SCNC: 14 MMOL/L (ref 10–20)
APAP SERPL-MCNC: (no result) MCG/ML (ref 10–30)
AST SERPL-CCNC: 65 U/L (ref 5–34)
BACTERIA UR QL AUTO: (no result) HPF
BASE EXCESS STD BLDV CALC-SCNC: -1.3 MEQ/L
BASOPHILS # BLD AUTO: 0 THOU/UL (ref 0–0.2)
BASOPHILS NFR BLD AUTO: 0.3 % (ref 0–1)
BILIRUB SERPL-MCNC: 0.8 MG/DL (ref 0.2–1.2)
BUN SERPL-MCNC: 18 MG/DL (ref 8.4–25.7)
CA-I BLDV-MCNC: 1.1 MMOL/L (ref 1.16–1.32)
CALCIUM SERPL-MCNC: 9 MG/DL (ref 7.8–10.44)
CAUTI INDICATIONS FOR CULTURE: (no result)
CHLORIDE BLDV-SCNC: 107 MMOL/L (ref 98–106)
CHLORIDE SERPL-SCNC: 106 MMOL/L (ref 98–107)
CO2 SERPL-SCNC: 18 MMOL/L (ref 23–31)
CREAT CL PREDICTED SERPL C-G-VRATE: 0 ML/MIN (ref 70–130)
DRUG SCREEN CUTOFF: (no result)
EOSINOPHIL # BLD AUTO: 0 THOU/UL (ref 0–0.7)
EOSINOPHIL NFR BLD AUTO: 0.5 % (ref 0–10)
GLOBULIN SER CALC-MCNC: 3.1 G/DL (ref 2.4–3.5)
GLUCOSE SERPL-MCNC: 113 MG/DL (ref 80–115)
HCO3 BLDV-SCNC: 22.5 MEQ/L (ref 22–28)
HCT VFR BLD CALC: 42.3 % (ref 42–52)
HCT VFR BLDV CALC: 42 % (ref 42–52)
HGB BLD-MCNC: 14.5 G/DL (ref 14–18)
HGB BLDV-MCNC: 14.3 G/DL (ref 13.1–17.2)
LEUKOCYTE ESTERASE UR QL STRIP.AUTO: 500 LEU/UL
LYMPHOCYTES NFR BLD AUTO: 7.4 % (ref 21–51)
MCH RBC QN AUTO: 31.9 PG (ref 27–31)
MCV RBC AUTO: 93.2 FL (ref 78–98)
MONOCYTES # BLD AUTO: 0.8 THOU/UL (ref 0.11–0.59)
MONOCYTES NFR BLD AUTO: 8.9 % (ref 0–10)
NEUTROPHILS # BLD AUTO: 7.2 THOU/UL (ref 1.4–6.5)
NEUTROPHILS NFR BLD AUTO: 82.6 % (ref 42–75)
PLATELET # BLD AUTO: 210 10X3/UL (ref 130–400)
POTASSIUM BLDV-SCNC: 3.31 MMOL/L (ref 3.7–5.3)
POTASSIUM SERPL-SCNC: 3.4 MMOL/L (ref 3.5–5.1)
PROT UR STRIP.AUTO-MCNC: 50 MG/DL
RBC # BLD AUTO: 4.54 MILL/UL (ref 4.7–6.1)
RBC UR QL AUTO: (no result) HPF (ref 0–3)
SALICYLATES SERPL-MCNC: (no result) MG/DL (ref 15–30)
SODIUM BLDV-SCNC: 137.1 MMOL/L (ref 133–146)
SODIUM SERPL-SCNC: 135 MMOL/L (ref 136–145)
SP GR UR STRIP: 1.01 (ref 1–1.04)
TROPONIN I SERPL DL<=0.01 NG/ML-MCNC: (no result) NG/ML (ref ?–0.03)
WBC # BLD AUTO: 8.8 10X3/UL (ref 4.8–10.8)
WBC UR QL AUTO: (no result) HPF (ref 0–3)

## 2023-09-14 PROCEDURE — 72125 CT NECK SPINE W/O DYE: CPT

## 2023-09-14 PROCEDURE — 80048 BASIC METABOLIC PNL TOTAL CA: CPT

## 2023-09-14 PROCEDURE — 71045 X-RAY EXAM CHEST 1 VIEW: CPT

## 2023-09-14 PROCEDURE — 82550 ASSAY OF CK (CPK): CPT

## 2023-09-14 PROCEDURE — 96376 TX/PRO/DX INJ SAME DRUG ADON: CPT

## 2023-09-14 PROCEDURE — 82805 BLOOD GASES W/O2 SATURATION: CPT

## 2023-09-14 PROCEDURE — 85730 THROMBOPLASTIN TIME PARTIAL: CPT

## 2023-09-14 PROCEDURE — 74177 CT ABD & PELVIS W/CONTRAST: CPT

## 2023-09-14 PROCEDURE — 71260 CT THORAX DX C+: CPT

## 2023-09-14 PROCEDURE — 36415 COLL VENOUS BLD VENIPUNCTURE: CPT

## 2023-09-14 PROCEDURE — 70450 CT HEAD/BRAIN W/O DYE: CPT

## 2023-09-14 PROCEDURE — 84100 ASSAY OF PHOSPHORUS: CPT

## 2023-09-14 PROCEDURE — 96361 HYDRATE IV INFUSION ADD-ON: CPT

## 2023-09-14 PROCEDURE — 83735 ASSAY OF MAGNESIUM: CPT

## 2023-09-14 PROCEDURE — 83880 ASSAY OF NATRIURETIC PEPTIDE: CPT

## 2023-09-14 PROCEDURE — 96367 TX/PROPH/DG ADDL SEQ IV INF: CPT

## 2023-09-14 PROCEDURE — 74022 RADEX COMPL AQT ABD SERIES: CPT

## 2023-09-14 PROCEDURE — 81001 URINALYSIS AUTO W/SCOPE: CPT

## 2023-09-14 PROCEDURE — 80307 DRUG TEST PRSMV CHEM ANLYZR: CPT

## 2023-09-14 PROCEDURE — S0028 INJECTION, FAMOTIDINE, 20 MG: HCPCS

## 2023-09-14 PROCEDURE — 93010 ELECTROCARDIOGRAM REPORT: CPT

## 2023-09-14 PROCEDURE — 87086 URINE CULTURE/COLONY COUNT: CPT

## 2023-09-14 PROCEDURE — 80053 COMPREHEN METABOLIC PANEL: CPT

## 2023-09-14 PROCEDURE — 96375 TX/PRO/DX INJ NEW DRUG ADDON: CPT

## 2023-09-14 PROCEDURE — 93880 EXTRACRANIAL BILAT STUDY: CPT

## 2023-09-14 PROCEDURE — 85610 PROTHROMBIN TIME: CPT

## 2023-09-14 PROCEDURE — 36416 COLLJ CAPILLARY BLOOD SPEC: CPT

## 2023-09-14 PROCEDURE — 96372 THER/PROPH/DIAG INJ SC/IM: CPT

## 2023-09-14 PROCEDURE — 85025 COMPLETE CBC W/AUTO DIFF WBC: CPT

## 2023-09-14 PROCEDURE — 87040 BLOOD CULTURE FOR BACTERIA: CPT

## 2023-09-14 PROCEDURE — 80306 DRUG TEST PRSMV INSTRMNT: CPT

## 2023-09-14 PROCEDURE — 96365 THER/PROPH/DIAG IV INF INIT: CPT

## 2023-09-14 PROCEDURE — 93005 ELECTROCARDIOGRAM TRACING: CPT

## 2023-09-14 PROCEDURE — 84443 ASSAY THYROID STIM HORMONE: CPT

## 2023-09-14 PROCEDURE — 83605 ASSAY OF LACTIC ACID: CPT

## 2023-09-14 PROCEDURE — 84484 ASSAY OF TROPONIN QUANT: CPT

## 2023-09-14 PROCEDURE — 82140 ASSAY OF AMMONIA: CPT

## 2023-09-14 PROCEDURE — 96366 THER/PROPH/DIAG IV INF ADDON: CPT

## 2023-09-14 PROCEDURE — 80076 HEPATIC FUNCTION PANEL: CPT

## 2023-09-14 PROCEDURE — 93306 TTE W/DOPPLER COMPLETE: CPT

## 2023-09-14 RX ADMIN — Medication SCH ML: at 20:21

## 2023-09-14 RX ADMIN — FAMOTIDINE SCH MG: 10 INJECTION, SOLUTION INTRAVENOUS at 20:22

## 2023-09-15 LAB
ALBUMIN SERPL BCG-MCNC: 3.5 G/DL (ref 3.4–4.8)
ALP SERPL-CCNC: 273 U/L (ref 40–110)
ALT SERPL W P-5'-P-CCNC: 25 U/L (ref 8–55)
ANION GAP SERPL CALC-SCNC: 17 MMOL/L (ref 10–20)
APTT PPP: 25.8 SEC (ref 22.9–36.1)
AST SERPL-CCNC: 47 U/L (ref 5–34)
BASOPHILS # BLD AUTO: 0 THOU/UL (ref 0–0.2)
BASOPHILS NFR BLD AUTO: 0.3 % (ref 0–1)
BILIRUB DIRECT SERPL-MCNC: 0.3 MG/DL (ref 0.1–0.3)
BILIRUB SERPL-MCNC: 0.7 MG/DL (ref 0.2–1.2)
BUN SERPL-MCNC: 17 MG/DL (ref 8.4–25.7)
CALCIUM SERPL-MCNC: 9.7 MG/DL (ref 7.8–10.44)
CHLORIDE SERPL-SCNC: 109 MMOL/L (ref 98–107)
CK SERPL-CCNC: 1099 U/L (ref 30–200)
CO2 SERPL-SCNC: 20 MMOL/L (ref 23–31)
CREAT CL PREDICTED SERPL C-G-VRATE: 29 ML/MIN (ref 70–130)
EOSINOPHIL # BLD AUTO: 0 THOU/UL (ref 0–0.7)
EOSINOPHIL NFR BLD AUTO: 0.2 % (ref 0–10)
GLUCOSE SERPL-MCNC: 138 MG/DL (ref 80–115)
HCT VFR BLD CALC: 46.8 % (ref 42–52)
HGB BLD-MCNC: 16.1 G/DL (ref 14–18)
INR PPP: 1.2
LYMPHOCYTES NFR BLD AUTO: 7.1 % (ref 21–51)
MCH RBC QN AUTO: 32.1 PG (ref 27–31)
MCV RBC AUTO: 93.2 FL (ref 78–98)
MONOCYTES # BLD AUTO: 0.6 THOU/UL (ref 0.11–0.59)
MONOCYTES NFR BLD AUTO: 5.8 % (ref 0–10)
NEUTROPHILS # BLD AUTO: 8.5 THOU/UL (ref 1.4–6.5)
NEUTROPHILS NFR BLD AUTO: 86.2 % (ref 42–75)
PLATELET # BLD AUTO: 229 10X3/UL (ref 130–400)
POTASSIUM SERPL-SCNC: 3.5 MMOL/L (ref 3.5–5.1)
PROTHROMBIN TIME: 15.4 SEC (ref 12–14.7)
RBC # BLD AUTO: 5.02 MILL/UL (ref 4.7–6.1)
SODIUM SERPL-SCNC: 142 MMOL/L (ref 136–145)
WBC # BLD AUTO: 9.8 10X3/UL (ref 4.8–10.8)

## 2023-09-15 RX ADMIN — Medication SCH ML: at 20:27

## 2023-09-15 RX ADMIN — SCOPALAMINE SCH MG: 1 PATCH, EXTENDED RELEASE TRANSDERMAL at 11:53

## 2023-09-15 RX ADMIN — ACETAMINOPHEN AND CODEINE PHOSPHATE SCH TAB: 300; 30 TABLET ORAL at 22:53

## 2023-09-15 RX ADMIN — FAMOTIDINE SCH MG: 10 INJECTION, SOLUTION INTRAVENOUS at 20:25

## 2023-09-15 RX ADMIN — Medication SCH ML: at 09:19

## 2023-09-15 RX ADMIN — ACETAMINOPHEN AND CODEINE PHOSPHATE SCH TAB: 300; 30 TABLET ORAL at 17:51

## 2023-09-16 LAB
ALBUMIN SERPL BCG-MCNC: 3.1 G/DL (ref 3.4–4.8)
ALP SERPL-CCNC: 181 U/L (ref 40–110)
ALT SERPL W P-5'-P-CCNC: 20 U/L (ref 8–55)
ANION GAP SERPL CALC-SCNC: 12 MMOL/L (ref 10–20)
APTT PPP: 26.8 SEC (ref 22.9–36.1)
AST SERPL-CCNC: 35 U/L (ref 5–34)
BASOPHILS # BLD AUTO: 0.1 THOU/UL (ref 0–0.2)
BASOPHILS NFR BLD AUTO: 0.6 % (ref 0–1)
BILIRUB DIRECT SERPL-MCNC: 0.1 MG/DL (ref 0.1–0.3)
BILIRUB SERPL-MCNC: 0.4 MG/DL (ref 0.2–1.2)
BUN SERPL-MCNC: 23 MG/DL (ref 8.4–25.7)
CALCIUM SERPL-MCNC: 8.7 MG/DL (ref 7.8–10.44)
CHLORIDE SERPL-SCNC: 111 MMOL/L (ref 98–107)
CK SERPL-CCNC: 501 U/L (ref 30–200)
CO2 SERPL-SCNC: 21 MMOL/L (ref 23–31)
CREAT CL PREDICTED SERPL C-G-VRATE: 24 ML/MIN (ref 70–130)
EOSINOPHIL # BLD AUTO: 0.4 THOU/UL (ref 0–0.7)
EOSINOPHIL NFR BLD AUTO: 4.6 % (ref 0–10)
GLUCOSE SERPL-MCNC: 90 MG/DL (ref 80–115)
HCT VFR BLD CALC: 40.6 % (ref 42–52)
HGB BLD-MCNC: 13.5 G/DL (ref 14–18)
INR PPP: 1.1
LYMPHOCYTES NFR BLD AUTO: 10.2 % (ref 21–51)
MAGNESIUM SERPL-MCNC: 1.9 MG/DL (ref 1.6–2.6)
MCH RBC QN AUTO: 32.2 PG (ref 27–31)
MCV RBC AUTO: 96.9 FL (ref 78–98)
MONOCYTES # BLD AUTO: 0.6 THOU/UL (ref 0.11–0.59)
MONOCYTES NFR BLD AUTO: 7 % (ref 0–10)
NEUTROPHILS # BLD AUTO: 6.3 THOU/UL (ref 1.4–6.5)
NEUTROPHILS NFR BLD AUTO: 77.1 % (ref 42–75)
PLATELET # BLD AUTO: 184 10X3/UL (ref 130–400)
POTASSIUM SERPL-SCNC: 3.7 MMOL/L (ref 3.5–5.1)
PROTHROMBIN TIME: 14.4 SEC (ref 12–14.7)
RBC # BLD AUTO: 4.19 MILL/UL (ref 4.7–6.1)
SODIUM SERPL-SCNC: 140 MMOL/L (ref 136–145)
WBC # BLD AUTO: 8.2 10X3/UL (ref 4.8–10.8)

## 2023-09-16 RX ADMIN — HEPARIN SODIUM SCH UNITS: 5000 INJECTION, SOLUTION INTRAVENOUS; SUBCUTANEOUS at 08:48

## 2023-09-16 RX ADMIN — FAMOTIDINE SCH MG: 10 INJECTION, SOLUTION INTRAVENOUS at 20:13

## 2023-09-16 RX ADMIN — ACETAMINOPHEN AND CODEINE PHOSPHATE SCH TAB: 300; 30 TABLET ORAL at 11:44

## 2023-09-16 RX ADMIN — HEPARIN SODIUM SCH UNITS: 5000 INJECTION, SOLUTION INTRAVENOUS; SUBCUTANEOUS at 20:13

## 2023-09-16 RX ADMIN — ACETAMINOPHEN AND CODEINE PHOSPHATE SCH TAB: 300; 30 TABLET ORAL at 22:55

## 2023-09-16 RX ADMIN — ACETAMINOPHEN AND CODEINE PHOSPHATE SCH: 300; 30 TABLET ORAL at 06:20

## 2023-09-16 RX ADMIN — Medication SCH ML: at 20:20

## 2023-09-16 RX ADMIN — Medication SCH: at 08:45

## 2023-09-16 RX ADMIN — ACETAMINOPHEN AND CODEINE PHOSPHATE SCH TAB: 300; 30 TABLET ORAL at 17:56

## 2023-09-17 LAB
ANION GAP SERPL CALC-SCNC: 13 MMOL/L (ref 10–20)
BUN SERPL-MCNC: 27 MG/DL (ref 8.4–25.7)
CALCIUM SERPL-MCNC: 8.5 MG/DL (ref 7.8–10.44)
CHLORIDE SERPL-SCNC: 107 MMOL/L (ref 98–107)
CO2 SERPL-SCNC: 20 MMOL/L (ref 23–31)
CREAT CL PREDICTED SERPL C-G-VRATE: 28 ML/MIN (ref 70–130)
GLUCOSE SERPL-MCNC: 97 MG/DL (ref 80–115)
POTASSIUM SERPL-SCNC: 3.6 MMOL/L (ref 3.5–5.1)
SODIUM SERPL-SCNC: 136 MMOL/L (ref 136–145)

## 2023-09-17 RX ADMIN — Medication SCH ML: at 11:52

## 2023-09-17 RX ADMIN — ACETAMINOPHEN AND CODEINE PHOSPHATE SCH TAB: 300; 30 TABLET ORAL at 17:46

## 2023-09-17 RX ADMIN — ACETAMINOPHEN AND CODEINE PHOSPHATE SCH TAB: 300; 30 TABLET ORAL at 05:06

## 2023-09-17 RX ADMIN — Medication SCH ML: at 21:36

## 2023-09-17 RX ADMIN — ACETAMINOPHEN AND CODEINE PHOSPHATE SCH TAB: 300; 30 TABLET ORAL at 23:14

## 2023-09-17 RX ADMIN — ACETAMINOPHEN AND CODEINE PHOSPHATE SCH TAB: 300; 30 TABLET ORAL at 11:46

## 2023-09-17 RX ADMIN — HEPARIN SODIUM SCH UNITS: 5000 INJECTION, SOLUTION INTRAVENOUS; SUBCUTANEOUS at 09:11

## 2023-09-17 RX ADMIN — HEPARIN SODIUM SCH UNITS: 5000 INJECTION, SOLUTION INTRAVENOUS; SUBCUTANEOUS at 21:31

## 2023-09-18 VITALS — DIASTOLIC BLOOD PRESSURE: 78 MMHG | SYSTOLIC BLOOD PRESSURE: 125 MMHG | TEMPERATURE: 97.4 F

## 2023-09-18 LAB
ANION GAP SERPL CALC-SCNC: 12 MMOL/L (ref 10–20)
BUN SERPL-MCNC: 27 MG/DL (ref 8.4–25.7)
CALCIUM SERPL-MCNC: 9.3 MG/DL (ref 7.8–10.44)
CHLORIDE SERPL-SCNC: 105 MMOL/L (ref 98–107)
CO2 SERPL-SCNC: 23 MMOL/L (ref 23–31)
CREAT CL PREDICTED SERPL C-G-VRATE: 30 ML/MIN (ref 70–130)
GLUCOSE SERPL-MCNC: 105 MG/DL (ref 80–115)
POTASSIUM SERPL-SCNC: 4.2 MMOL/L (ref 3.5–5.1)
SODIUM SERPL-SCNC: 136 MMOL/L (ref 136–145)

## 2023-09-18 RX ADMIN — HEPARIN SODIUM SCH UNITS: 5000 INJECTION, SOLUTION INTRAVENOUS; SUBCUTANEOUS at 09:27

## 2023-09-18 RX ADMIN — Medication SCH ML: at 09:28

## 2023-09-18 RX ADMIN — SCOPALAMINE SCH MG: 1 PATCH, EXTENDED RELEASE TRANSDERMAL at 11:51

## 2023-09-18 RX ADMIN — ACETAMINOPHEN AND CODEINE PHOSPHATE SCH TAB: 300; 30 TABLET ORAL at 05:24

## 2023-09-18 RX ADMIN — ACETAMINOPHEN AND CODEINE PHOSPHATE SCH TAB: 300; 30 TABLET ORAL at 11:51

## 2023-10-09 ENCOUNTER — HOSPITAL ENCOUNTER (OUTPATIENT)
Dept: HOSPITAL 92 - BICCT | Age: 63
Discharge: HOME | End: 2023-10-09
Attending: PHYSICIAN ASSISTANT
Payer: COMMERCIAL

## 2023-10-09 DIAGNOSIS — M47.812: ICD-10-CM

## 2023-10-09 DIAGNOSIS — M48.02: ICD-10-CM

## 2023-10-09 DIAGNOSIS — M43.12: Primary | ICD-10-CM

## 2023-10-09 PROCEDURE — 72125 CT NECK SPINE W/O DYE: CPT
